# Patient Record
Sex: MALE | Race: BLACK OR AFRICAN AMERICAN
[De-identification: names, ages, dates, MRNs, and addresses within clinical notes are randomized per-mention and may not be internally consistent; named-entity substitution may affect disease eponyms.]

---

## 2017-07-25 ENCOUNTER — HOSPITAL ENCOUNTER (EMERGENCY)
Dept: HOSPITAL 17 - NEPD | Age: 37
LOS: 1 days | Discharge: HOME | End: 2017-07-26
Payer: MEDICAID

## 2017-07-25 VITALS
RESPIRATION RATE: 16 BRPM | DIASTOLIC BLOOD PRESSURE: 65 MMHG | TEMPERATURE: 99.2 F | OXYGEN SATURATION: 96 % | HEART RATE: 108 BPM | SYSTOLIC BLOOD PRESSURE: 116 MMHG

## 2017-07-25 DIAGNOSIS — F41.9: ICD-10-CM

## 2017-07-25 DIAGNOSIS — I27.2: ICD-10-CM

## 2017-07-25 DIAGNOSIS — R00.0: ICD-10-CM

## 2017-07-25 DIAGNOSIS — K21.9: ICD-10-CM

## 2017-07-25 DIAGNOSIS — Z79.899: ICD-10-CM

## 2017-07-25 DIAGNOSIS — K94.20: Primary | ICD-10-CM

## 2017-07-25 DIAGNOSIS — F17.200: ICD-10-CM

## 2017-07-25 PROCEDURE — 99284 EMERGENCY DEPT VISIT MOD MDM: CPT

## 2017-07-25 PROCEDURE — 96372 THER/PROPH/DIAG INJ SC/IM: CPT

## 2017-07-25 PROCEDURE — 74000: CPT

## 2017-07-25 PROCEDURE — 43760: CPT

## 2017-07-26 NOTE — RADRPT
EXAM DATE/TIME:  07/26/2017 02:07 

 

[facility] COMPARISON:     

No previous studies available for comparison.

 

                     

INDICATIONS :     

G tube placement.

                     

 

MEDICAL HISTORY :     

Sickle Cell disease.          

 

SURGICAL HISTORY :     

Cholecystectomy.   

 

ENCOUNTER:     

Initial                                        

 

ACUITY:     

1 day      

 

PAIN SCORE:     

0/10

 

LOCATION:      

upper quadrant abdomen.

 

FINDINGS:     

Single supine view of the abdomen demonstrates G-tube overlying the left upper quadrant. The recently
 injected contrast material fills the stomach. No leak is identified. No organomegaly or abnormal gaetano
cifications are identified. Bones demonstrate no acute finding. Lung bases are clear.

 

CONCLUSION:     

G-tube in expected position in the stomach. There is no leak.

 

 

 

 Kirit Morgan MD on July 26, 2017 at 2:23           

Board Certified Radiologist.

 This report was verified electronically.
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
## 2018-06-26 ENCOUNTER — HOSPITAL ENCOUNTER (INPATIENT)
Dept: HOSPITAL 17 - NEPC | Age: 38
LOS: 1 days | Discharge: LEFT BEFORE DISCHARGE | DRG: 812 | End: 2018-06-27
Attending: FAMILY MEDICINE | Admitting: FAMILY MEDICINE
Payer: MEDICAID

## 2018-06-26 VITALS
HEART RATE: 105 BPM | RESPIRATION RATE: 16 BRPM | SYSTOLIC BLOOD PRESSURE: 157 MMHG | DIASTOLIC BLOOD PRESSURE: 120 MMHG | OXYGEN SATURATION: 100 % | TEMPERATURE: 98.8 F

## 2018-06-26 VITALS
RESPIRATION RATE: 16 BRPM | SYSTOLIC BLOOD PRESSURE: 133 MMHG | DIASTOLIC BLOOD PRESSURE: 61 MMHG | TEMPERATURE: 98.9 F | HEART RATE: 89 BPM | OXYGEN SATURATION: 100 %

## 2018-06-26 VITALS
HEART RATE: 108 BPM | TEMPERATURE: 100.3 F | OXYGEN SATURATION: 99 % | SYSTOLIC BLOOD PRESSURE: 118 MMHG | RESPIRATION RATE: 16 BRPM | DIASTOLIC BLOOD PRESSURE: 58 MMHG

## 2018-06-26 VITALS
DIASTOLIC BLOOD PRESSURE: 61 MMHG | RESPIRATION RATE: 16 BRPM | SYSTOLIC BLOOD PRESSURE: 131 MMHG | HEART RATE: 94 BPM | OXYGEN SATURATION: 94 %

## 2018-06-26 VITALS — WEIGHT: 154.32 LBS | BODY MASS INDEX: 19.81 KG/M2 | HEIGHT: 74 IN

## 2018-06-26 VITALS
DIASTOLIC BLOOD PRESSURE: 53 MMHG | OXYGEN SATURATION: 100 % | RESPIRATION RATE: 16 BRPM | HEART RATE: 92 BPM | SYSTOLIC BLOOD PRESSURE: 105 MMHG

## 2018-06-26 VITALS — OXYGEN SATURATION: 88 %

## 2018-06-26 DIAGNOSIS — D57.1: Primary | ICD-10-CM

## 2018-06-26 DIAGNOSIS — F12.90: ICD-10-CM

## 2018-06-26 DIAGNOSIS — R07.9: ICD-10-CM

## 2018-06-26 DIAGNOSIS — K21.9: ICD-10-CM

## 2018-06-26 DIAGNOSIS — G40.909: ICD-10-CM

## 2018-06-26 DIAGNOSIS — E78.5: ICD-10-CM

## 2018-06-26 LAB
ALBUMIN SERPL-MCNC: 3.6 GM/DL (ref 3.4–5)
ALP SERPL-CCNC: 57 U/L (ref 45–117)
ALT SERPL-CCNC: 23 U/L (ref 12–78)
AST SERPL-CCNC: 59 U/L (ref 15–37)
BASOPHILS # BLD AUTO: 0.2 TH/MM3 (ref 0–0.2)
BASOPHILS NFR BLD: 1.5 % (ref 0–2)
BILIRUB SERPL-MCNC: 2.2 MG/DL (ref 0.2–1)
BUN SERPL-MCNC: 15 MG/DL (ref 7–18)
CALCIUM SERPL-MCNC: 8.1 MG/DL (ref 8.5–10.1)
CHLORIDE SERPL-SCNC: 108 MEQ/L (ref 98–107)
COLOR UR: YELLOW
CREAT SERPL-MCNC: 0.96 MG/DL (ref 0.6–1.3)
CRP SERPL-MCNC: 0.54 MG/DL (ref 0–0.3)
EOSINOPHIL # BLD: 0.2 TH/MM3 (ref 0–0.4)
EOSINOPHIL NFR BLD: 1 % (ref 0–4)
ERYTHROCYTE [DISTWIDTH] IN BLOOD BY AUTOMATED COUNT: 27.2 % (ref 11.6–17.2)
GFR SERPLBLD BASED ON 1.73 SQ M-ARVRAT: 106 ML/MIN (ref 89–?)
GLUCOSE SERPL-MCNC: 106 MG/DL (ref 74–106)
GLUCOSE UR STRIP-MCNC: (no result) MG/DL
HCO3 BLD-SCNC: 25.6 MEQ/L (ref 21–32)
HCT VFR BLD CALC: 15.1 % (ref 39–51)
HGB BLD-MCNC: 5.4 GM/DL (ref 13–17)
HGB S BLD QL SOLY: (no result)
HGB UR QL STRIP: (no result)
INR PPP: 1.2 RATIO
KETONES UR STRIP-MCNC: (no result) MG/DL
LYMPHOCYTES # BLD AUTO: 4.5 TH/MM3 (ref 1–4.8)
LYMPHOCYTES NFR BLD AUTO: 28.6 % (ref 9–44)
LYMPHOCYTES: 30 % (ref 9–44)
MAGNESIUM SERPL-MCNC: 2.2 MG/DL (ref 1.5–2.5)
MCH RBC QN AUTO: 32.7 PG (ref 27–34)
MCHC RBC AUTO-ENTMCNC: 35.6 % (ref 32–36)
MCV RBC AUTO: 91.8 FL (ref 80–100)
METAMYELOCYTES NFR BLD: 1 % (ref 0–1)
MONOCYTE #: 1.1 TH/MM3 (ref 0–0.9)
MONOCYTES NFR BLD: 7.3 % (ref 0–8)
MONOCYTES: 3 % (ref 0–8)
NEUTROPHILS # BLD AUTO: 9.6 TH/MM3 (ref 1.8–7.7)
NEUTROPHILS NFR BLD AUTO: 61.6 % (ref 16–70)
NEUTS BAND # BLD MANUAL: 10.1 TH/MM3 (ref 1.8–7.7)
NEUTS BAND NFR BLD: 5 % (ref 0–6)
NEUTS SEG NFR BLD MANUAL: 59 % (ref 16–70)
NITRITE UR QL STRIP: (no result)
NUCLEATED RED BLOOD CELL: 6 (ref 0–0)
OVALOCYTES BLD QL SMEAR: (no result)
PLATELET # BLD: 200 TH/MM3 (ref 150–450)
PMV BLD AUTO: 11.1 FL (ref 7–11)
POLYCHROMASIA BLD QL SMEAR: 12 % (ref 0–1.9)
PROT SERPL-MCNC: 6.7 GM/DL (ref 6.4–8.2)
PROTHROMBIN TIME: 11.8 SEC (ref 9.8–11.6)
RBC # BLD AUTO: 1.64 MIL/MM3 (ref 4.5–5.9)
RETIC #: 259.5 MIL/L (ref 20–150)
RETICS/RBC NFR: 15.8 % (ref 0.4–3)
SODIUM SERPL-SCNC: 140 MEQ/L (ref 136–145)
SP GR UR STRIP: 1 (ref 1–1.03)
TROPONIN I SERPL-MCNC: (no result) NG/ML (ref 0.02–0.05)
URINE LEUKOCYTE ESTERASE: (no result)
WBC # BLD AUTO: 15.6 TH/MM3 (ref 4–11)
WBC NRBC COR # BLD: 6 /100 WBC (ref 0–0)

## 2018-06-26 PROCEDURE — 86850 RBC ANTIBODY SCREEN: CPT

## 2018-06-26 PROCEDURE — 86140 C-REACTIVE PROTEIN: CPT

## 2018-06-26 PROCEDURE — 86920 COMPATIBILITY TEST SPIN: CPT

## 2018-06-26 PROCEDURE — 71275 CT ANGIOGRAPHY CHEST: CPT

## 2018-06-26 PROCEDURE — 85007 BL SMEAR W/DIFF WBC COUNT: CPT

## 2018-06-26 PROCEDURE — 82550 ASSAY OF CK (CPK): CPT

## 2018-06-26 PROCEDURE — P9016 RBC LEUKOCYTES REDUCED: HCPCS

## 2018-06-26 PROCEDURE — 87070 CULTURE OTHR SPECIMN AEROBIC: CPT

## 2018-06-26 PROCEDURE — 87186 SC STD MICRODIL/AGAR DIL: CPT

## 2018-06-26 PROCEDURE — 86403 PARTICLE AGGLUT ANTBDY SCRN: CPT

## 2018-06-26 PROCEDURE — 83880 ASSAY OF NATRIURETIC PEPTIDE: CPT

## 2018-06-26 PROCEDURE — 83690 ASSAY OF LIPASE: CPT

## 2018-06-26 PROCEDURE — 83735 ASSAY OF MAGNESIUM: CPT

## 2018-06-26 PROCEDURE — 85044 MANUAL RETICULOCYTE COUNT: CPT

## 2018-06-26 PROCEDURE — 71045 X-RAY EXAM CHEST 1 VIEW: CPT

## 2018-06-26 PROCEDURE — 85610 PROTHROMBIN TIME: CPT

## 2018-06-26 PROCEDURE — 86900 BLOOD TYPING SEROLOGIC ABO: CPT

## 2018-06-26 PROCEDURE — 30233N1 TRANSFUSION OF NONAUTOLOGOUS RED BLOOD CELLS INTO PERIPHERAL VEIN, PERCUTANEOUS APPROACH: ICD-10-PCS

## 2018-06-26 PROCEDURE — 85730 THROMBOPLASTIN TIME PARTIAL: CPT

## 2018-06-26 PROCEDURE — 85027 COMPLETE CBC AUTOMATED: CPT

## 2018-06-26 PROCEDURE — 36430 TRANSFUSION BLD/BLD COMPNT: CPT

## 2018-06-26 PROCEDURE — 87040 BLOOD CULTURE FOR BACTERIA: CPT

## 2018-06-26 PROCEDURE — 93005 ELECTROCARDIOGRAM TRACING: CPT

## 2018-06-26 PROCEDURE — 87077 CULTURE AEROBIC IDENTIFY: CPT

## 2018-06-26 PROCEDURE — 84484 ASSAY OF TROPONIN QUANT: CPT

## 2018-06-26 PROCEDURE — 80053 COMPREHEN METABOLIC PANEL: CPT

## 2018-06-26 PROCEDURE — 86901 BLOOD TYPING SEROLOGIC RH(D): CPT

## 2018-06-26 PROCEDURE — 81001 URINALYSIS AUTO W/SCOPE: CPT

## 2018-06-26 PROCEDURE — 87205 SMEAR GRAM STAIN: CPT

## 2018-06-26 NOTE — RADRPT
EXAM DATE:  6/26/2018 7:54 PM EDT

AGE/SEX:        38 years / Male



INDICATIONS:  Short of breath.



CLINICAL DATA:  This is the patient's initial encounter. Patient reports that signs and symptoms have
 been present for 1 day and indicates a pain score of 0/10. 

                                                                          

MEDICAL/SURGICAL HISTORY:       Sickle Cell disease.  Hypertension. . Tracheostomy.



COMPARISON:      [facility], CHEST SINGLE AP, 7/9/2017.  . 





FINDINGS:  

A single AP view of the chest demonstrates the lungs to be symmetrically aerated without evidence of 
mass, infiltrate or effusion.  The cardiomediastinal contours are unremarkable.  Osseous structures a
re intact.  





CONCLUSION: 

Negative examination.



Electronically signed by: Meño Terry MD  6/26/2018 8:27 PM EDT

## 2018-06-26 NOTE — HHI.HP
__________________________________________________





\[facility]\""


Service


[facility]


Primary Care Physician


Aman Bang D.O.


Admission Diagnosis





Sickle cell anemia, symptomatic anemia, cp r/o acs vs pe


Diagnoses:  


Travel History


International Travel<30 Days:  No


Contact w/Intl Traveler <30 Da:  No


Traveled to Known Affected Are:  No


History of Present Illness


38-year-old male with a past medical history significant for sickle cell 

disease and seizure disorder presents the emergency department for evaluation 

of fatigue.  The patient reports he has been feeling tired and sluggish 1 

week.  He denies any dizziness or associated lightheadedness.  He reports a pain

/pressure that he describes as a cramping sensation in his left chest and upper 

shoulder.  The patient also complains of a chronic ulcer on his left ankle.  He 

denies any shortness of breath.  No abdominal pain.  No nausea/vomiting/

diarrhea.  No fever/chills





Review of Systems


Except as stated in HPI:  all other systems reviewed are Neg





Past Family Social History


Past Medical History


Sickle cell disease


Seizure disorder


Past Surgical History


Reconstructive facial surgery


Several incision and drainages


Cholecystectomy


Craniotomy for aneurysm repair


Reported Medications





Reported Meds & Active Scripts


Active


Folic Acid 1 Mg Tablet 1 Mg PO DAILY


Reported


Pravastatin 40 Mg Tab 40 Mg PO DAILY


Keppra (Levetiracetam) 500 Mg Tab 500 Mg PO BID


Fioricet (Butalbital-Acetaminophen-Caffeine) -40 Mg Cap 1 Cap PO Q4H PRN


Gabapentin 300 Mg Cap 300 Mg PO BID


Oxycodone (Oxycodone HCl) 15 Mg Tab 15 Mg PO Q6HR


Allergies:  


Coded Allergies:  


     No Known Allergies (Verified  Adverse Reaction, Unknown, 18)


Family History


Mother with diabetes mellitus


Social History


Smokes marijuana daily.  Denies alcohol and tobacco.





Physical Exam


Vital Signs





Vital Signs








  Date Time  Temp Pulse Resp B/P (MAP) Pulse Ox O2 Delivery O2 Flow Rate FiO2


 


18 22:20 98.9 89 16 133/61 100   


 


18 22:03 98.8 105 16 157/120 100   


 


18 19:15  94 16 131/61 (84) 94 Nasal Cannula 2.00 


 


6/26/18 19:13     88 Room Air  


 


18 18:39  92 17  98 Room Air  


 


18 16:33 100.3 108 16 118/58 (78) 99   








Physical Exam


GENERAL: -American male sitting up in bed


SKIN: 3 cm ulceration to the lateral aspect of the left ankle that is dry and 

nondraining.  No surrounding erythema or fluctuance.


HEAD: Atraumatic. Normocephalic. No temporal or scalp tenderness.


EYES: Pupils equal round and reactive. Extraocular motions intact. No scleral 

icterus. No injection or drainage. 


ENT: Nose without bleeding, purulent drainage or septal hematoma. Throat 

without erythema, tonsillar hypertrophy or exudate. Uvula midline. Airway 

patent.


NECK: Trachea midline. No JVD or lymphadenopathy. Supple, nontender, no 

meningeal signs.


CARDIOVASCULAR: Regular rate and rhythm without murmurs, gallops, or rubs. 


RESPIRATORY: Clear to auscultation. Breath sounds equal bilaterally. No wheezes

, rales, or rhonchi.  


GASTROINTESTINAL: Abdomen soft, non-tender, nondistended. No hepato-splenomegaly

, or palpable masses. No guarding.


MUSCULOSKELETAL: Extremities without clubbing, cyanosis, or edema. No joint 

tenderness, effusion, or edema noted. No calf tenderness. 


NEUROLOGICAL: Awake and alert. Cranial nerves II through XII intact.  Motor and 

sensory grossly within normal limits. Normal speech.


Laboratory





Laboratory Tests








Test


  18


18:50 18


21:20


 


White Blood Count 15.6  


 


Red Blood Count 1.64  


 


Hemoglobin 5.4  


 


Hematocrit 15.1  


 


Mean Corpuscular Volume 91.8  


 


Mean Corpuscular Hemoglobin 32.7  


 


Mean Corpuscular Hemoglobin


Concent 35.6 


  


 


 


Red Cell Distribution Width 27.2  


 


Platelet Count 200  


 


Mean Platelet Volume 11.1  


 


Neutrophils (%) (Auto) 61.6  


 


Lymphocytes (%) (Auto) 28.6  


 


Monocytes (%) (Auto) 7.3  


 


Eosinophils (%) (Auto) 1.0  


 


Basophils (%) (Auto) 1.5  


 


Neutrophils # (Auto) 9.6  


 


Lymphocytes # (Auto) 4.5  


 


Monocytes # (Auto) 1.1  


 


Eosinophils # (Auto) 0.2  


 


Basophils # (Auto) 0.2  


 


CBC Comment AUTO DIFF  


 


Differential Total Cells


Counted 100 


  


 


 


Neutrophils % (Manual) 59  


 


Band Neutrophils % 5  


 


Lymphocytes % 30  


 


Monocytes % 3  


 


Eosinophils % 2  


 


Neutrophils # (Manual) 10.1  


 


Metamyelocytes 1  


 


Nucleated Red Blood Cells 6  


 


Differential Comment


  FINAL DIFF


MANUAL 


 


 


Platelet Estimate NORMAL  


 


Platelet Morphology Comment ENLARGED  


 


Polychromasia 12.0  


 


Spherocytes   


 


Sickle Cells 3+  


 


Ovalocytes 1+  


 


Reticulocyte Count 15.8  


 


Absolute Reticulocyte Count 259.5  


 


Prothrombin Time 11.8  


 


Prothromb Time International


Ratio 1.2 


  


 


 


Activated Partial


Thromboplast Time 23.4 


  


 


 


Blood Urea Nitrogen 15  


 


Creatinine 0.96  


 


Random Glucose 106  


 


Total Protein 6.7  


 


Albumin 3.6  


 


Calcium Level 8.1  


 


Magnesium Level 2.2  


 


Alkaline Phosphatase 57  


 


Aspartate Amino Transf


(AST/SGOT) 59 


  


 


 


Alanine Aminotransferase


(ALT/SGPT) 23 


  


 


 


Total Bilirubin 2.2  


 


Sodium Level 140  


 


Potassium Level 4.4  


 


Chloride Level 108  


 


Carbon Dioxide Level 25.6  


 


Anion Gap 6  


 


Estimat Glomerular Filtration


Rate 106 


  


 


 


Total Creatine Kinase 41  


 


Troponin I LESS THAN 0.02  


 


C-Reactive Protein 0.54  


 


B-Type Natriuretic Peptide 61  


 


Lipase 67  


 


Urine Color  YELLOW 


 


Urine Turbidity  CLEAR 


 


Urine pH  6.0 


 


Urine Specific Gravity  1.003 


 


Urine Protein  100 


 


Urine Glucose (UA)  NEG 


 


Urine Ketones  NEG 


 


Urine Occult Blood  MOD 


 


Urine Nitrite  NEG 


 


Urine Bilirubin  NEG 


 


Urine Urobilinogen  LESS THAN 2 


 


Urine Leukocyte Esterase  NEG 


 


Microscopic Urinalysis Comment


  


  CULT NOT


INDICATED














 Date/Time


Source Procedure


Growth Status


 


 


 18 21:30


Blood Peripheral Aerobic Blood Culture


Pending Received


 


 18 21:30


Blood Peripheral Anaerobic Blood Culture


Pending Received





 18 19:25


Wound Ankle Gram Stain


Pending Received


 


 18 19:25


Wound Ankle Wound Culture


Pending Received








Result Diagram:  


18








Caprini VTE Risk Assessment


Caprini VTE Risk Assessment:  No/Low Risk (score <= 1)


Caprini Risk Assessment Model











 Point Value = 1          Point Value = 2  Point Value = 3  Point Value = 5


 


Age 41-60


Minor surgery


BMI > 25 kg/m2


Swollen legs


Varicose veins


Pregnancy or postpartum


History of unexplained or recurrent


   spontaneous 


Oral contraceptives or hormone


   replacement


Sepsis (< 1 month)


Serious lung disease, including


   pneumonia (< 1 month)


Abnormal pulmonary function


Acute myocardial infarction


Congestive heart failure (< 1 month)


History of inflammatory bowel disease


Medical patient at bed rest Age 61-74


Arthroscopic surgery


Major open surgery (> 45 min)


Laparoscopic surgery (> 45 min)


Malignancy


Confined to bed (> 72 hours)


Immobilizing plaster cast


Central venous access Age >= 75


History of VTE


Family history of VTE


Factor V Leiden


Prothrombin 28741X


Lupus anticoagulant


Anticardiolipin antibodies


Elevated serum homocysteine


Heparin-induced thrombocytopenia


Other congenital or acquired


   thrombophilia Stroke (< 1 month)


Elective arthroplasty


Hip, pelvis, or leg fracture


Acute spinal cord injury (< 1 month)








Prophylaxis Regimen











   Total Risk


Factor Score Risk Level Prophylaxis Regimen


 


0-1      Low Early ambulation


 


2 Moderate Order ONE of the following:


*Sequential Compression Device (SCD)


*Heparin 5000 units SQ BID


 


3-4 Higher Order ONE of the following medications:


*Heparin 5000 units SQ TID


*Enoxaparin/Lovenox 40 mg SQ daily (WT < 150 kg, CrCl > 30 mL/min)


*Enoxaparin/Lovenox 30 mg SQ daily (WT < 150 kg, CrCl > 10-29 mL/min)


*Enoxaparin/Lovenox 30 mg SQ BID (WT < 150 kg, CrCl > 30 mL/min)


AND/OR


*Sequential Compression Device (SCD)


 


5 or more Highest Order ONE of the following medications:


*Heparin 5000 units SQ TID (Preferred with Epidurals)


*Enoxaparin/Lovenox 40 mg SQ daily (WT < 150 kg, CrCl > 30 mL/min)


*Enoxaparin/Lovenox 30 mg SQ daily (WT < 150 kg, CrCl > 10-29 mL/min)


*Enoxaparin/Lovenox 30 mg SQ BID (WT < 150 kg, CrCl > 30 mL/min)


AND


*Sequential Compression Device (SCD)











Assessment and Plan


Assessment and Plan


Assessment/plan:





1.  Symptomatic anemia


H&H 5.4/15.1


Transfuse 2 units packed red blood cells





2.  Chest pain


Initial troponin negative


EKG showed normal sinus rhythm without ST segment elevations or depressions, 

personally reviewed


ACS rule out pending; serial troponins/EKGs





3.  Seizure disorder


Continue home Keppra





4.  Sickle cell disease


Continue pain control with home oxycodone


Continue folic acid





FEN


Regular diet


Electrolytes: Monitor and replete as needed





Physician Certification


2 Midnight Certification Type:  Admission for Inpatient Services


Order for Inpatient Services


The services are ordered in accordance with Medicare regulations or non-

Medicare payer requirements, as applicable.  In the case of services not 

specified as inpatient-only, they are appropriately provided as inpatient 

services in accordance with the 2-midnight benchmark.


Estimated LOS (days):  2


2 days is the estimated time the patient will need to remain in the hospital, 

assuming treatment plan goals are met and no additional complications.


Post-Hospital Plan:  Not yet determined











Marce Davis MD 2018 22:45
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
## 2018-06-26 NOTE — RADRPT
EXAM DATE:  6/26/2018 9:17 PM EDT

AGE/SEX:        38 years / Male



INDICATIONS:  Chest pain. 



CLINICAL DATA:  This is the patient's initial encounter. Patient reports that signs and symptoms have
 been present for 1 day and indicates a pain score of 8/10. 

                                                                          

MEDICAL/SURGICAL HISTORY:   Hypertension.  Sickle Cell disease.  Gastroesophageal reflux disease. Cho
lecystectomy.



RADIATION DOSE:  8.51 CTDI (mGy)









COMPARISON:      No prior exams available for comparison. 





TECHNIQUE:  Volumetric scanning was performed using a multi-row detector CT scanner during bolus infu
sven of 75 ml Omnipaque 350 (iohexol)  nonionic water-soluble contrast as a cumulative dose for multi
ple exams. The data was post processed with a variety of visualization algorithms including full volu
me maximum intensity projection and sliding thin slab reformation.  Using automated exposure control 
and adjustment of the mA and/or kV according to patient size, radiation dose was kept as low as reaso
nably achievable to obtain optimal diagnostic quality images.  DICOM format image data is available e
lectronically for review and comparison. 



FINDINGS:  

The examination is of limited diagnostic quality due to bolus timing issues. There is no large or nona
tral pulmonary embolus. The third order and distal branches are not adequately visualized.



The heart is normal in size. The thoracic aorta is intact. No hilar or mediastinal adenopathy is seen
. No axillary adenopathy is identified.



The visualized pulmonary parenchyma is clear.



Incidental note is made of what is likely a sebaceous cyst along the left chest wall anteriorly measu
ring 2.1 x 0.8 cm.



The visualized bony structures are grossly intact.



CONCLUSION:

1.  The exam is somewhat limited due to bolus timing issues. No large or central pulmonary embolus is
 identified. The distal branches are not adequately visualized.

2.  The lungs are clear.



Electronically signed by: Meño Terry MD  6/26/2018 9:39 PM EDT

## 2018-06-27 VITALS
DIASTOLIC BLOOD PRESSURE: 63 MMHG | RESPIRATION RATE: 17 BRPM | SYSTOLIC BLOOD PRESSURE: 135 MMHG | HEART RATE: 78 BPM | OXYGEN SATURATION: 98 % | TEMPERATURE: 98.4 F

## 2018-06-27 VITALS
TEMPERATURE: 98.5 F | SYSTOLIC BLOOD PRESSURE: 140 MMHG | RESPIRATION RATE: 20 BRPM | HEART RATE: 75 BPM | OXYGEN SATURATION: 100 % | DIASTOLIC BLOOD PRESSURE: 65 MMHG

## 2018-06-27 VITALS
HEART RATE: 78 BPM | RESPIRATION RATE: 18 BRPM | OXYGEN SATURATION: 98 % | SYSTOLIC BLOOD PRESSURE: 125 MMHG | DIASTOLIC BLOOD PRESSURE: 58 MMHG | TEMPERATURE: 98.8 F

## 2018-06-27 VITALS
TEMPERATURE: 98.5 F | SYSTOLIC BLOOD PRESSURE: 135 MMHG | HEART RATE: 77 BPM | DIASTOLIC BLOOD PRESSURE: 62 MMHG | RESPIRATION RATE: 18 BRPM | OXYGEN SATURATION: 97 %

## 2018-06-27 NOTE — EKG
Date Performed: 06/26/2018       Time Performed: 20:21:48

 

PTAGE:      38 years

 

EKG:      Sinus rhythm 

 

 NONSPECIFIC T-WAVE ABNORMALITY ABNORMAL ECG 

 

NO PREVIOUS TRACING            

 

DOCTOR:   Stefan Whitman  Interpretating Date/Time  06/27/2018 20:49:18